# Patient Record
Sex: MALE | Race: WHITE | Employment: OTHER | URBAN - METROPOLITAN AREA
[De-identification: names, ages, dates, MRNs, and addresses within clinical notes are randomized per-mention and may not be internally consistent; named-entity substitution may affect disease eponyms.]

---

## 2018-07-18 ENCOUNTER — HOSPITAL ENCOUNTER (OUTPATIENT)
Facility: CLINIC | Age: 73
Setting detail: OBSERVATION
Discharge: HOME | End: 2018-07-19
Attending: EMERGENCY MEDICINE | Admitting: INTERNAL MEDICINE
Payer: COMMERCIAL

## 2018-07-18 DIAGNOSIS — K62.5 RECTAL HEMORRHAGE: ICD-10-CM

## 2018-07-18 PROBLEM — K92.2 GI BLEED: Status: ACTIVE | Noted: 2018-07-18

## 2018-07-18 LAB
ABO + RH BLD: NORMAL
ABO + RH BLD: NORMAL
ALBUMIN SERPL-MCNC: 3.6 G/DL (ref 3.4–5)
ALP SERPL-CCNC: 125 U/L (ref 40–150)
ALT SERPL W P-5'-P-CCNC: 33 U/L (ref 0–70)
ANION GAP SERPL CALCULATED.3IONS-SCNC: 9 MMOL/L (ref 3–14)
APTT PPP: 26 SEC (ref 22–37)
AST SERPL W P-5'-P-CCNC: 32 U/L (ref 0–45)
BASOPHILS # BLD AUTO: 0 10E9/L (ref 0–0.2)
BASOPHILS NFR BLD AUTO: 0.3 %
BILIRUB SERPL-MCNC: 0.3 MG/DL (ref 0.2–1.3)
BLD GP AB SCN SERPL QL: NORMAL
BLOOD BANK CMNT PATIENT-IMP: NORMAL
BUN SERPL-MCNC: 54 MG/DL (ref 7–30)
CALCIUM SERPL-MCNC: 9.1 MG/DL (ref 8.5–10.1)
CHLORIDE SERPL-SCNC: 111 MMOL/L (ref 94–109)
CO2 SERPL-SCNC: 20 MMOL/L (ref 20–32)
CREAT SERPL-MCNC: 1.16 MG/DL (ref 0.66–1.25)
DIFFERENTIAL METHOD BLD: ABNORMAL
EOSINOPHIL # BLD AUTO: 0.2 10E9/L (ref 0–0.7)
EOSINOPHIL NFR BLD AUTO: 1.3 %
ERYTHROCYTE [DISTWIDTH] IN BLOOD BY AUTOMATED COUNT: 14.3 % (ref 10–15)
GFR SERPL CREATININE-BSD FRML MDRD: 61 ML/MIN/1.7M2
GLUCOSE SERPL-MCNC: 146 MG/DL (ref 70–99)
HCT VFR BLD AUTO: 40.1 % (ref 40–53)
HEMOCCULT STL QL: POSITIVE
HGB BLD-MCNC: 13.1 G/DL (ref 13.3–17.7)
HGB BLD-MCNC: 13.4 G/DL (ref 13.3–17.7)
IMM GRANULOCYTES # BLD: 0.2 10E9/L (ref 0–0.4)
IMM GRANULOCYTES NFR BLD: 1.6 %
INR PPP: 1 (ref 0.86–1.14)
IRON SATN MFR SERPL: 22 % (ref 15–46)
IRON SERPL-MCNC: 74 UG/DL (ref 35–180)
LYMPHOCYTES # BLD AUTO: 1.7 10E9/L (ref 0.8–5.3)
LYMPHOCYTES NFR BLD AUTO: 13.3 %
MAGNESIUM SERPL-MCNC: 1.6 MG/DL (ref 1.6–2.3)
MCH RBC QN AUTO: 30.3 PG (ref 26.5–33)
MCHC RBC AUTO-ENTMCNC: 33.4 G/DL (ref 31.5–36.5)
MCV RBC AUTO: 91 FL (ref 78–100)
MONOCYTES # BLD AUTO: 0.9 10E9/L (ref 0–1.3)
MONOCYTES NFR BLD AUTO: 7.2 %
NEUTROPHILS # BLD AUTO: 9.7 10E9/L (ref 1.6–8.3)
NEUTROPHILS NFR BLD AUTO: 76.3 %
NRBC # BLD AUTO: 0 10*3/UL
NRBC BLD AUTO-RTO: 0 /100
PLATELET # BLD AUTO: 216 10E9/L (ref 150–450)
POTASSIUM SERPL-SCNC: 5.1 MMOL/L (ref 3.4–5.3)
PROT SERPL-MCNC: 7.3 G/DL (ref 6.8–8.8)
RBC # BLD AUTO: 4.42 10E12/L (ref 4.4–5.9)
SODIUM SERPL-SCNC: 140 MMOL/L (ref 133–144)
SPECIMEN EXP DATE BLD: NORMAL
TIBC SERPL-MCNC: 332 UG/DL (ref 240–430)
TSH SERPL DL<=0.005 MIU/L-ACNC: 1.82 MU/L (ref 0.4–4)
WBC # BLD AUTO: 12.7 10E9/L (ref 4–11)

## 2018-07-18 PROCEDURE — 80053 COMPREHEN METABOLIC PANEL: CPT | Performed by: EMERGENCY MEDICINE

## 2018-07-18 PROCEDURE — 25000132 ZZH RX MED GY IP 250 OP 250 PS 637: Mod: GY | Performed by: INTERNAL MEDICINE

## 2018-07-18 PROCEDURE — 25000128 H RX IP 250 OP 636: Performed by: HOSPITALIST

## 2018-07-18 PROCEDURE — 85025 COMPLETE CBC W/AUTO DIFF WBC: CPT | Performed by: EMERGENCY MEDICINE

## 2018-07-18 PROCEDURE — 83735 ASSAY OF MAGNESIUM: CPT | Performed by: HOSPITALIST

## 2018-07-18 PROCEDURE — 99223 1ST HOSP IP/OBS HIGH 75: CPT | Mod: AI | Performed by: HOSPITALIST

## 2018-07-18 PROCEDURE — 83550 IRON BINDING TEST: CPT | Performed by: EMERGENCY MEDICINE

## 2018-07-18 PROCEDURE — 12000000 ZZH R&B MED SURG/OB

## 2018-07-18 PROCEDURE — 36415 COLL VENOUS BLD VENIPUNCTURE: CPT | Performed by: HOSPITALIST

## 2018-07-18 PROCEDURE — 85018 HEMOGLOBIN: CPT | Performed by: HOSPITALIST

## 2018-07-18 PROCEDURE — 83540 ASSAY OF IRON: CPT | Performed by: EMERGENCY MEDICINE

## 2018-07-18 PROCEDURE — A9270 NON-COVERED ITEM OR SERVICE: HCPCS | Mod: GY | Performed by: INTERNAL MEDICINE

## 2018-07-18 PROCEDURE — 86901 BLOOD TYPING SEROLOGIC RH(D): CPT | Performed by: EMERGENCY MEDICINE

## 2018-07-18 PROCEDURE — 85730 THROMBOPLASTIN TIME PARTIAL: CPT | Performed by: EMERGENCY MEDICINE

## 2018-07-18 PROCEDURE — 82272 OCCULT BLD FECES 1-3 TESTS: CPT | Performed by: EMERGENCY MEDICINE

## 2018-07-18 PROCEDURE — 25000132 ZZH RX MED GY IP 250 OP 250 PS 637: Mod: GY | Performed by: HOSPITALIST

## 2018-07-18 PROCEDURE — 86900 BLOOD TYPING SEROLOGIC ABO: CPT | Performed by: EMERGENCY MEDICINE

## 2018-07-18 PROCEDURE — 85610 PROTHROMBIN TIME: CPT | Performed by: EMERGENCY MEDICINE

## 2018-07-18 PROCEDURE — 84443 ASSAY THYROID STIM HORMONE: CPT | Performed by: EMERGENCY MEDICINE

## 2018-07-18 PROCEDURE — 83036 HEMOGLOBIN GLYCOSYLATED A1C: CPT | Performed by: HOSPITALIST

## 2018-07-18 PROCEDURE — 99285 EMERGENCY DEPT VISIT HI MDM: CPT

## 2018-07-18 PROCEDURE — 86850 RBC ANTIBODY SCREEN: CPT | Performed by: EMERGENCY MEDICINE

## 2018-07-18 PROCEDURE — A9270 NON-COVERED ITEM OR SERVICE: HCPCS | Mod: GY | Performed by: HOSPITALIST

## 2018-07-18 RX ORDER — ONDANSETRON 2 MG/ML
4 INJECTION INTRAMUSCULAR; INTRAVENOUS EVERY 6 HOURS PRN
Status: DISCONTINUED | OUTPATIENT
Start: 2018-07-18 | End: 2018-07-19 | Stop reason: HOSPADM

## 2018-07-18 RX ORDER — ONDANSETRON 4 MG/1
4 TABLET, ORALLY DISINTEGRATING ORAL EVERY 6 HOURS PRN
Status: DISCONTINUED | OUTPATIENT
Start: 2018-07-18 | End: 2018-07-19 | Stop reason: HOSPADM

## 2018-07-18 RX ORDER — NALOXONE HYDROCHLORIDE 0.4 MG/ML
.1-.4 INJECTION, SOLUTION INTRAMUSCULAR; INTRAVENOUS; SUBCUTANEOUS
Status: DISCONTINUED | OUTPATIENT
Start: 2018-07-18 | End: 2018-07-19 | Stop reason: HOSPADM

## 2018-07-18 RX ORDER — POTASSIUM CHLORIDE 29.8 MG/ML
20 INJECTION INTRAVENOUS
Status: DISCONTINUED | OUTPATIENT
Start: 2018-07-18 | End: 2018-07-19 | Stop reason: HOSPADM

## 2018-07-18 RX ORDER — MAGNESIUM SULFATE HEPTAHYDRATE 40 MG/ML
4 INJECTION, SOLUTION INTRAVENOUS EVERY 4 HOURS PRN
Status: DISCONTINUED | OUTPATIENT
Start: 2018-07-18 | End: 2018-07-19 | Stop reason: HOSPADM

## 2018-07-18 RX ORDER — MULTIVITAMIN,THERAPEUTIC
1 TABLET ORAL DAILY
COMMUNITY

## 2018-07-18 RX ORDER — OXYCODONE HYDROCHLORIDE 5 MG/1
5-10 TABLET ORAL
Status: DISCONTINUED | OUTPATIENT
Start: 2018-07-18 | End: 2018-07-19 | Stop reason: HOSPADM

## 2018-07-18 RX ORDER — ISOSORBIDE MONONITRATE 30 MG/1
30 TABLET, EXTENDED RELEASE ORAL DAILY
Status: DISCONTINUED | OUTPATIENT
Start: 2018-07-19 | End: 2018-07-19 | Stop reason: HOSPADM

## 2018-07-18 RX ORDER — DILTIAZEM HYDROCHLORIDE 240 MG/1
240 CAPSULE, EXTENDED RELEASE ORAL EVERY MORNING
COMMUNITY

## 2018-07-18 RX ORDER — ACETAMINOPHEN 650 MG/1
650 SUPPOSITORY RECTAL EVERY 4 HOURS PRN
Status: DISCONTINUED | OUTPATIENT
Start: 2018-07-18 | End: 2018-07-19 | Stop reason: HOSPADM

## 2018-07-18 RX ORDER — ACETAMINOPHEN 325 MG/1
650 TABLET ORAL EVERY 4 HOURS PRN
Status: DISCONTINUED | OUTPATIENT
Start: 2018-07-18 | End: 2018-07-19 | Stop reason: HOSPADM

## 2018-07-18 RX ORDER — TAMSULOSIN HYDROCHLORIDE 0.4 MG/1
0.4 CAPSULE ORAL AT BEDTIME
COMMUNITY

## 2018-07-18 RX ORDER — PANTOPRAZOLE SODIUM 40 MG/1
40 TABLET, DELAYED RELEASE ORAL
Status: DISCONTINUED | OUTPATIENT
Start: 2018-07-18 | End: 2018-07-19 | Stop reason: HOSPADM

## 2018-07-18 RX ORDER — SODIUM CHLORIDE 9 MG/ML
INJECTION, SOLUTION INTRAVENOUS CONTINUOUS
Status: DISCONTINUED | OUTPATIENT
Start: 2018-07-18 | End: 2018-07-19

## 2018-07-18 RX ORDER — POTASSIUM CHLORIDE 1.5 G/1.58G
20-40 POWDER, FOR SOLUTION ORAL
Status: DISCONTINUED | OUTPATIENT
Start: 2018-07-18 | End: 2018-07-19 | Stop reason: HOSPADM

## 2018-07-18 RX ORDER — MORPHINE SULFATE 2 MG/ML
1 INJECTION, SOLUTION INTRAMUSCULAR; INTRAVENOUS
Status: DISCONTINUED | OUTPATIENT
Start: 2018-07-18 | End: 2018-07-19 | Stop reason: HOSPADM

## 2018-07-18 RX ORDER — POTASSIUM CHLORIDE 1500 MG/1
20-40 TABLET, EXTENDED RELEASE ORAL
Status: DISCONTINUED | OUTPATIENT
Start: 2018-07-18 | End: 2018-07-19 | Stop reason: HOSPADM

## 2018-07-18 RX ORDER — ATORVASTATIN CALCIUM 40 MG/1
40 TABLET, FILM COATED ORAL AT BEDTIME
Status: DISCONTINUED | OUTPATIENT
Start: 2018-07-18 | End: 2018-07-19 | Stop reason: HOSPADM

## 2018-07-18 RX ORDER — POTASSIUM CL/LIDO/0.9 % NACL 10MEQ/0.1L
10 INTRAVENOUS SOLUTION, PIGGYBACK (ML) INTRAVENOUS
Status: DISCONTINUED | OUTPATIENT
Start: 2018-07-18 | End: 2018-07-19 | Stop reason: HOSPADM

## 2018-07-18 RX ORDER — POTASSIUM CHLORIDE 7.45 MG/ML
10 INJECTION INTRAVENOUS
Status: DISCONTINUED | OUTPATIENT
Start: 2018-07-18 | End: 2018-07-19 | Stop reason: HOSPADM

## 2018-07-18 RX ADMIN — ATORVASTATIN CALCIUM 40 MG: 40 TABLET, FILM COATED ORAL at 22:50

## 2018-07-18 RX ADMIN — PANTOPRAZOLE SODIUM 40 MG: 40 TABLET, DELAYED RELEASE ORAL at 22:50

## 2018-07-18 RX ADMIN — SODIUM CHLORIDE: 9 INJECTION, SOLUTION INTRAVENOUS at 22:58

## 2018-07-18 RX ADMIN — POLYETHYLENE GLYCOL 3350, SODIUM SULFATE ANHYDROUS, SODIUM BICARBONATE, SODIUM CHLORIDE, POTASSIUM CHLORIDE 4000 ML: 236; 22.74; 6.74; 5.86; 2.97 POWDER, FOR SOLUTION ORAL at 22:50

## 2018-07-18 ASSESSMENT — ACTIVITIES OF DAILY LIVING (ADL)
RETIRED_EATING: 0-->INDEPENDENT
SWALLOWING: 0-->SWALLOWS FOODS/LIQUIDS WITHOUT DIFFICULTY
RETIRED_COMMUNICATION: 0-->UNDERSTANDS/COMMUNICATES WITHOUT DIFFICULTY
FALL_HISTORY_WITHIN_LAST_SIX_MONTHS: NO
BATHING: 0-->INDEPENDENT
COGNITION: 0 - NO COGNITION ISSUES REPORTED
DRESS: 0-->INDEPENDENT
AMBULATION: 0-->INDEPENDENT
TOILETING: 0-->INDEPENDENT
TRANSFERRING: 0-->INDEPENDENT

## 2018-07-18 ASSESSMENT — ENCOUNTER SYMPTOMS
BLOOD IN STOOL: 1
ABDOMINAL PAIN: 1
LIGHT-HEADEDNESS: 0
SHORTNESS OF BREATH: 0
DIZZINESS: 0
CONSTIPATION: 0
WEAKNESS: 1
ANAL BLEEDING: 1

## 2018-07-18 NOTE — IP AVS SNAPSHOT
[facility] [facility]    [address] [place] [address]    Phone:  [phone]                                       After Visit Summary   7/18/2018    Alexys Coombs    MRN: [id number]           After Visit Summary Signature Page     I have received my discharge instructions, and my questions have been answered. I have discussed any challenges I see with this plan with the nurse or doctor.    ..........................................................................................................................................  Patient/Patient Representative Signature      ..........................................................................................................................................  Patient Representative Print Name and Relationship to Patient    ..................................................               ................................................  Date                                            Time    ..........................................................................................................................................  Reviewed by Signature/Title    ...................................................              ..............................................  Date                                                            Time

## 2018-07-18 NOTE — IP AVS SNAPSHOT
MRN:[id number]                      After Visit Summary   7/18/2018    Alexys Coombs    MRN: [id number]           Thank you!     Thank you for choosing [facility] for your care. Our goal is always to provide you with excellent care. Hearing back from our patients is one way we can continue to improve our services. Please take a few minutes to complete the written survey that you may receive in the mail after you visit with us. Thank you!        Patient Information     Date Of Birth          1945        Designated Caregiver       Most Recent Value    Caregiver    Will someone help with your care after discharge? yes    Name of designated caregiver zach (wife)    Phone number of caregiver [phone]    Caregiver address [address] [place] [address] [address] [place] [id number]      About your hospital stay     You were admitted on:  July 18, 2018 You last received care in the:  [facility] [address] [facility]    You were discharged on:  July 19, 2018        Reason for your hospital stay       You were admitted to the hospital secondary to rectal Bleed and was found to have Hemorid.                  Who to Call     For medical emergencies, please call 911.  For non-urgent questions about your medical care, please call your primary care provider or clinic, None  For questions related to your surgery, please call your surgery clinic        Attending Provider     Provider Specialty    Tip Morgan MD Emergency Medicine    Jennifer Hanson MD Internal Medicine    Susie Veloz MD Internal Medicine       Primary Care Provider Fax #    Physician No Ref-Primary [phone]      After Care Instructions     Activity       Your activity upon discharge: activity as tolerated and no driving for today            Diet       Follow this diet upon discharge: Orders Placed This Encounter      Regular Diet Adult              Discharge Instructions       You can continue your home medications as described before  "after the discharge.                  Follow-up Appointments     Follow-up and recommended labs and tests       Follow up with primary care provider, Physician No Ref-Primary, within 7 days to evaluate treatment change and for hospital follow- up.  No follow up labs or test are needed.                  Further instructions from your care team       GLASSES, NECKLACE, AND RING WITH PT - returned to patient    Pending Results     No orders found for last 3 day(s).            Statement of Approval     Ordered          18 1225  I have reviewed and agree with all the recommendations and orders detailed in this document.  EFFECTIVE NOW     Approved and electronically signed by:  Susie Veloz MD             Admission Information     Date & Time Provider Department Dept. Phone    2018 Susie Veloz MD [facility] [address] [facility] [phone]      Your Vitals Were     Blood Pressure Pulse Temperature Respirations Height Weight    120/52 (BP Location: Left arm) 83 97.7  F (36.5  C) (Oral) 14 1.778 m (5' 10\") 108.1 kg (238 lb 5.1 oz)    Pulse Oximetry BMI (Body Mass Index)                94% 34.2 kg/m2          [employer] Information     [employer] lets you send messages to your doctor, view your test results, renew your prescriptions, schedule appointments and more. To sign up, go to [email or web address] . Click on \"Log in\" on the left side of the screen, which will take you to the Welcome page. Then click on \"Sign up Now\" on the right side of the page.     You will be asked to enter the access code listed below, as well as some personal information. Please follow the directions to create your username and password.     Your access code is: [email or web address]  Expires: 10/17/2018  1:33 PM     Your access code will  in 90 days. If you need help or a new code, please call your [facility] clinic or [phone].        Care EveryWhere ID     This is your Care EveryWhere ID. This could be used by other " organizations to access your [facility] medical records  [id number]        Equal Access to Services     MERRYREGAN MYRON : Esperanza Easley, sarwat villasenor, janis nair, carlos sullivan. So [facility] [phone].    ATENCIÓN: Si habla español, tiene a ford disposición servicios gratuitos de asistencia lingüística. Llame al [phone].    We comply with applicable federal civil rights laws and [place] laws. We do not discriminate on the basis of race, color, national origin, age, disability, sex, sexual orientation, or gender identity.               Review of your medicines      CONTINUE these medicines which have NOT CHANGED        Dose / Directions    ASPIRIN EC PO        Dose:  81 mg   Take 81 mg by mouth daily   Refills:  0       ATORVASTATIN CALCIUM PO        Dose:  40 mg   Take 40 mg by mouth every evening   Refills:  0       Co-Enzyme Q-10 100 MG Caps        Dose:  100 mg   Take 100 mg by mouth At Bedtime   Refills:  0       diltiazem 240 MG 24 hr ER beaded capsule   Commonly known as:  TIAZAC        Dose:  240 mg   Take 240 mg by mouth every morning   Refills:  0       FINASTERIDE PO        Dose:  5 mg   Take 5 mg by mouth At Bedtime   Refills:  0       FLOMAX 0.4 MG capsule   Generic drug:  tamsulosin        Dose:  0.4 mg   Take 0.4 mg by mouth At Bedtime   Refills:  0       HUMULIN R U-500 (CONCENTRATED) SC        Dose:  60 Units   Inject 60 Units Subcutaneous 3 times daily (before meals) Inject 60 Units of U-500 30 minutes before breakfast, 60 Units of U-500 30 minutes before afternoon meal and 60 Units of U-500 30 minutes before dinner.  (This is a HIGH RISK MEDICATION. U-500 INSULIN SYRINGES SHOULD BE USED TO MEASURE AND DELIVER INSULIN DOSE).  +++He states he uses the green syringes that go with the U-500 Insulin and draws up 60 units.+++   Refills:  0       IMDUR PO        Dose:  30 mg   Take 30 mg by mouth every morning   Refills:  0       METFORMIN HCL  PO        Dose:  500 mg   Take 500 mg by mouth 2 times daily (with meals)   Refills:  0       METOPROLOL TARTRATE PO        Dose:  12.5 mg   Take 12.5 mg by mouth 2 times daily   Refills:  0       multivitamin, therapeutic Tabs tablet        Dose:  1 tablet   Take 1 tablet by mouth daily   Refills:  0       PLAVIX PO        Dose:  75 mg   Take 75 mg by mouth every morning   Refills:  0       SODIUM BICARBONATE PO        Dose:  1300 mg   Take 1,300 mg by mouth 2 times daily 10 grain tablets --he takes 2 tablets twice daily.   Refills:  0       SPIRONOLACTONE PO        Dose:  25 mg   Take 25 mg by mouth At Bedtime   Refills:  0       TYLENOL PO        Dose:  500 mg   Take 500 mg by mouth 2 times daily   Refills:  0       VITAMIN D (CHOLECALCIFEROL) PO        Dose:  2000 Units   Take 2,000 Units by mouth 2 times daily   Refills:  0                Protect others around you: Learn how to safely use, store and throw away your medicines at www.disposemymeds.org.             Medication List: This is a list of all your medications and when to take them. Check marks below indicate your daily home schedule. Keep this list as a reference.      Medications           Morning Afternoon Evening Bedtime As Needed    ASPIRIN EC PO   Take 81 mg by mouth daily                                ATORVASTATIN CALCIUM PO   Take 40 mg by mouth every evening   Last time this was given:  40 mg on 7/18/2018 10:50 PM                                Co-Enzyme Q-10 100 MG Caps   Take 100 mg by mouth At Bedtime                                diltiazem 240 MG 24 hr ER beaded capsule   Commonly known as:  TIAZAC   Take 240 mg by mouth every morning                                FINASTERIDE PO   Take 5 mg by mouth At Bedtime                                FLOMAX 0.4 MG capsule   Take 0.4 mg by mouth At Bedtime   Generic drug:  tamsulosin                                HUMULIN R U-500 (CONCENTRATED) SC   Inject 60 Units Subcutaneous 3 times daily (before  meals) Inject 60 Units of U-500 30 minutes before breakfast, 60 Units of U-500 30 minutes before afternoon meal and 60 Units of U-500 30 minutes before dinner.  (This is a HIGH RISK MEDICATION. U-500 INSULIN SYRINGES SHOULD BE USED TO MEASURE AND DELIVER INSULIN DOSE).  +++He states he uses the green syringes that go with the U-500 Insulin and draws up 60 units.+++                                IMDUR PO   Take 30 mg by mouth every morning                                METFORMIN HCL PO   Take 500 mg by mouth 2 times daily (with meals)                                METOPROLOL TARTRATE PO   Take 12.5 mg by mouth 2 times daily                                multivitamin, therapeutic Tabs tablet   Take 1 tablet by mouth daily                                PLAVIX PO   Take 75 mg by mouth every morning                                SODIUM BICARBONATE PO   Take 1,300 mg by mouth 2 times daily 10 grain tablets --he takes 2 tablets twice daily.                                SPIRONOLACTONE PO   Take 25 mg by mouth At Bedtime                                TYLENOL PO   Take 500 mg by mouth 2 times daily                                VITAMIN D (CHOLECALCIFEROL) PO   Take 2,000 Units by mouth 2 times daily                                          More Information        Lower GI Bleeding (Stable)  You have signs of blood in your stool. This is called rectal bleeding. The bleeding may have begun in another part of your gastrointestinal (GI) tract. If the blood is bright red, it is likely coming from the lower part of the GI tract. If the blood is black or dark, it might be coming from higher up in the GI tract. Very small amounts of GI bleeding may not be visible and can only be discovered during a test on your stool. Possible causes of lower GI bleeding include:    Hemorrhoids    Anal fissures    Diverticulitis    Inflammatory bowel disease (Crohn's disease or ulcerative colitis)    Polyps (growths) in the  intestine  Note: Iron supplements and medicines for diarrhea or upset stomach can cause black stools. Foods such as licorice and red beets can also discolor the stool and be mistaken for bleeding. These are not bleeding and are not a cause for alarm.  Home care  You have not lost a large amount of blood and your condition appears stable at this time. You may resume normal activity as long as you feel well.  Don't take NSAIDs, such as aspirin, ibuprofen, or naproxen. They can irritate the stomach and cause further bleeding. If you are taking these medicines for other medical reasons, talk to your healthcare provider before you stop them.   Follow-up care  Follow up with your healthcare provider as advised. Further tests may be needed to find the cause of your bleeding.  When to seek medical advice  Call your healthcare provider right away for any of the following:    Large amount of rectal bleeding     Increasing abdominal pain    Weakness, dizziness  Call 911  Call 911 if any of the following occur:    Loss of consciousness    Vomiting blood  Date Last Reviewed: 6/24/2015 2000-2017 [employer]. [address] [place] [address]. All rights reserved. This information is not intended as a substitute for professional medical care. Always follow your healthcare professional's instructions.

## 2018-07-19 VITALS
SYSTOLIC BLOOD PRESSURE: 120 MMHG | RESPIRATION RATE: 14 BRPM | OXYGEN SATURATION: 94 % | HEART RATE: 83 BPM | WEIGHT: 238.32 LBS | BODY MASS INDEX: 34.12 KG/M2 | DIASTOLIC BLOOD PRESSURE: 52 MMHG | TEMPERATURE: 97.7 F | HEIGHT: 70 IN

## 2018-07-19 PROBLEM — K62.5 RECTAL BLEED: Status: ACTIVE | Noted: 2018-07-19

## 2018-07-19 LAB
ALBUMIN SERPL-MCNC: 3.2 G/DL (ref 3.4–5)
ALP SERPL-CCNC: 87 U/L (ref 40–150)
ALT SERPL W P-5'-P-CCNC: 29 U/L (ref 0–70)
ANION GAP SERPL CALCULATED.3IONS-SCNC: 11 MMOL/L (ref 3–14)
AST SERPL W P-5'-P-CCNC: 29 U/L (ref 0–45)
BILIRUB DIRECT SERPL-MCNC: 0.1 MG/DL (ref 0–0.2)
BILIRUB SERPL-MCNC: 0.6 MG/DL (ref 0.2–1.3)
BUN SERPL-MCNC: 43 MG/DL (ref 7–30)
CALCIUM SERPL-MCNC: 8.6 MG/DL (ref 8.5–10.1)
CHLORIDE SERPL-SCNC: 109 MMOL/L (ref 94–109)
CO2 SERPL-SCNC: 22 MMOL/L (ref 20–32)
COLONOSCOPY: NORMAL
CREAT SERPL-MCNC: 1.06 MG/DL (ref 0.66–1.25)
ERYTHROCYTE [DISTWIDTH] IN BLOOD BY AUTOMATED COUNT: 14.4 % (ref 10–15)
GFR SERPL CREATININE-BSD FRML MDRD: 68 ML/MIN/1.7M2
GLUCOSE BLDC GLUCOMTR-MCNC: 136 MG/DL (ref 70–99)
GLUCOSE BLDC GLUCOMTR-MCNC: 151 MG/DL (ref 70–99)
GLUCOSE BLDC GLUCOMTR-MCNC: 151 MG/DL (ref 70–99)
GLUCOSE SERPL-MCNC: 122 MG/DL (ref 70–99)
HBA1C MFR BLD: 7.2 % (ref 0–5.6)
HCT VFR BLD AUTO: 36 % (ref 40–53)
HGB BLD-MCNC: 11.8 G/DL (ref 13.3–17.7)
INR PPP: 1 (ref 0.86–1.14)
LIPASE SERPL-CCNC: 84 U/L (ref 73–393)
MCH RBC QN AUTO: 29.6 PG (ref 26.5–33)
MCHC RBC AUTO-ENTMCNC: 32.8 G/DL (ref 31.5–36.5)
MCV RBC AUTO: 90 FL (ref 78–100)
PLATELET # BLD AUTO: 205 10E9/L (ref 150–450)
POTASSIUM SERPL-SCNC: 4.2 MMOL/L (ref 3.4–5.3)
PROT SERPL-MCNC: 6.5 G/DL (ref 6.8–8.8)
RBC # BLD AUTO: 3.99 10E12/L (ref 4.4–5.9)
SODIUM SERPL-SCNC: 142 MMOL/L (ref 133–144)
UPPER GI ENDOSCOPY: NORMAL
WBC # BLD AUTO: 13.5 10E9/L (ref 4–11)

## 2018-07-19 PROCEDURE — 85610 PROTHROMBIN TIME: CPT | Performed by: HOSPITALIST

## 2018-07-19 PROCEDURE — 25000125 ZZHC RX 250: Performed by: INTERNAL MEDICINE

## 2018-07-19 PROCEDURE — 99217 ZZC OBSERVATION CARE DISCHARGE: CPT | Performed by: INTERNAL MEDICINE

## 2018-07-19 PROCEDURE — 36415 COLL VENOUS BLD VENIPUNCTURE: CPT | Performed by: HOSPITALIST

## 2018-07-19 PROCEDURE — 45378 DIAGNOSTIC COLONOSCOPY: CPT | Performed by: INTERNAL MEDICINE

## 2018-07-19 PROCEDURE — 80048 BASIC METABOLIC PNL TOTAL CA: CPT | Performed by: HOSPITALIST

## 2018-07-19 PROCEDURE — 25000131 ZZH RX MED GY IP 250 OP 636 PS 637: Performed by: HOSPITALIST

## 2018-07-19 PROCEDURE — 85027 COMPLETE CBC AUTOMATED: CPT | Performed by: HOSPITALIST

## 2018-07-19 PROCEDURE — A9270 NON-COVERED ITEM OR SERVICE: HCPCS | Mod: GY | Performed by: HOSPITALIST

## 2018-07-19 PROCEDURE — 80076 HEPATIC FUNCTION PANEL: CPT | Performed by: HOSPITALIST

## 2018-07-19 PROCEDURE — G0500 MOD SEDAT ENDO SERVICE >5YRS: HCPCS | Performed by: INTERNAL MEDICINE

## 2018-07-19 PROCEDURE — 25000128 H RX IP 250 OP 636: Performed by: INTERNAL MEDICINE

## 2018-07-19 PROCEDURE — 25000132 ZZH RX MED GY IP 250 OP 250 PS 637: Performed by: HOSPITALIST

## 2018-07-19 PROCEDURE — G0378 HOSPITAL OBSERVATION PER HR: HCPCS

## 2018-07-19 PROCEDURE — 43235 EGD DIAGNOSTIC BRUSH WASH: CPT | Performed by: INTERNAL MEDICINE

## 2018-07-19 PROCEDURE — 00000146 ZZHCL STATISTIC GLUCOSE BY METER IP

## 2018-07-19 PROCEDURE — 83690 ASSAY OF LIPASE: CPT | Performed by: HOSPITALIST

## 2018-07-19 RX ORDER — NICOTINE POLACRILEX 4 MG
15-30 LOZENGE BUCCAL
Status: DISCONTINUED | OUTPATIENT
Start: 2018-07-19 | End: 2018-07-19 | Stop reason: HOSPADM

## 2018-07-19 RX ORDER — FLUMAZENIL 0.1 MG/ML
0.2 INJECTION, SOLUTION INTRAVENOUS
Status: DISCONTINUED | OUTPATIENT
Start: 2018-07-19 | End: 2018-07-19 | Stop reason: HOSPADM

## 2018-07-19 RX ORDER — FENTANYL CITRATE 50 UG/ML
INJECTION, SOLUTION INTRAMUSCULAR; INTRAVENOUS PRN
Status: DISCONTINUED | OUTPATIENT
Start: 2018-07-19 | End: 2018-07-19 | Stop reason: HOSPADM

## 2018-07-19 RX ORDER — LIDOCAINE 40 MG/G
CREAM TOPICAL
Status: DISCONTINUED | OUTPATIENT
Start: 2018-07-19 | End: 2018-07-19 | Stop reason: HOSPADM

## 2018-07-19 RX ORDER — DEXTROSE MONOHYDRATE 25 G/50ML
25-50 INJECTION, SOLUTION INTRAVENOUS
Status: DISCONTINUED | OUTPATIENT
Start: 2018-07-19 | End: 2018-07-19 | Stop reason: HOSPADM

## 2018-07-19 RX ORDER — NALOXONE HYDROCHLORIDE 0.4 MG/ML
.1-.4 INJECTION, SOLUTION INTRAMUSCULAR; INTRAVENOUS; SUBCUTANEOUS
Status: DISCONTINUED | OUTPATIENT
Start: 2018-07-19 | End: 2018-07-19 | Stop reason: HOSPADM

## 2018-07-19 RX ADMIN — PANTOPRAZOLE SODIUM 40 MG: 40 TABLET, DELAYED RELEASE ORAL at 06:33

## 2018-07-19 RX ADMIN — INSULIN ASPART 1 UNITS: 100 INJECTION, SOLUTION INTRAVENOUS; SUBCUTANEOUS at 06:33

## 2018-07-19 ASSESSMENT — ACTIVITIES OF DAILY LIVING (ADL)
ADLS_ACUITY_SCORE: 9

## 2018-07-19 NOTE — PLAN OF CARE
Problem: Patient Care Overview  Goal: Plan of Care/Patient Progress Review  Outcome: No Change  A/O x4. VSS on RA. Up with SBA. Finished bowel prep at 0300. Going for colonoscopy and possibly EGD today. Time TBD. BS + , No N/V or c/o pain. Bleeding with BMs continues. Last Hb 13.1. NPO. IVF. On home CPAP overnight. RN will continue to monitor.

## 2018-07-19 NOTE — UTILIZATION REVIEW
"[facility]    Admission Status; Secondary Review Determination     Admission Date: 7/18/2018  7:47 PM      Under the authority of the Utilization Management Committee, the utilization review process indicated a secondary review on the above patient.  The review outcome is based on review of the medical records, discussions with staff, and applying clinical experience noted on the date of the review.          (x) Observation Status Appropriate - This patient does not meet hospital inpatient criteria and is placed in observation status. If this patient's primary payer is Medicare and was admitted as an inpatient, Condition Code 44 should be used and patient status changed to \"observation\".     RATIONALE FOR DETERMINATION   72 year old  male with medical history of coronary artery disease, peripheral arterial disease, hypertension, diabetes, hyperlipidemia, history of diverticulosis, hemorrhoids in the past presented to the ED with rectal bleeding.  Gastroenterology has been consulted and an EGD was performed showing an esophageal mild stricture and esophagitis.  Colonoscopy was also performed showing diverticulosis but no signs of bleeding.  A regular diet has been initiated and discharge home is expected today or tomorrow. At the time of this review, the patient does not meet medical necessity for inpatient hospitalization.     The severity of illness, intensity of service provided, expected LOS and risk for adverse outcome make the care appropriate for further observation; however, doesn't meet criteria for hospital inpatient admission. Dr Veloz was paged with this recommendation.        The information on this document is developed by the utilization review team in order for the business office to ensure compliance.  This only denotes the appropriateness of proper admission status and does not reflect the quality of care rendered.         The definitions of Inpatient Status and Observation Status " used in making the determination above are those provided in the CMS Coverage Manual, Chapter 1 and Chapter 6, section 70.4.      Sincerely,     Ab Day DO MPH   Physician Advisor  Utilization Review  [facility]

## 2018-07-19 NOTE — DISCHARGE SUMMARY
[facility]    Discharge Summary  Hospitalist    Date of Admission:  7/18/2018  Date of Discharge:  7/19/2018  Discharging Provider: Susie Veloz MD,FACP    Discharge Diagnoses     Active Problems:  Rectal bleed Most likely secondary to hemorrhoid.  History of coronary artery disease.  History of CVA.  Diabetes mellitus.  Essential hypertension.  Peripheral arterial disease.    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 Had 3 to 4 episodes during the day with blood clots that prompted him to come into the ED. No nausea vomiting. Denies any abdominal pain, no headache, no chest pain, no palpitations. Does not have any shortness of breath or cough or fever. Does have some lightheadedness on movements, had poor oral intake today.Patient states it's unusual than his diverticular or hemorrhoidal bleed that he had in the past around five years ago.  He denies any alcohol intake, no history of taking over-the-counter pain medications. No smoking. No family history of malignancy. Last colonoscopy was around 10 years back, states his primary doctor had referred him for a colonoscopy which is due currently.   In the ED on rectal exam had no external lesions, rectal tone normal, maroon red blood in the rectal vault. WBC 12.7, hemoglobin 13.4, platelets 216, INR 1.0.  Glucose 146, creatinine 1.16, chloride 111.  Occult blood positive.  Blood pressures have been around 100 to 120 systolic's received 1 L fluid bolus    Hospital Course   Alexys Coombs was admitted on 7/18/2018.  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 Had 3 to 4 episodes during the day with blood clots that prompted him to come into the ED.Patient stated it's unusual than his diverticular or hemorrhoidal bleed that he had in the past around five years ago. In the ED on rectal exam had no external lesions, rectal tone normal, maroon red blood in the rectal vault. WBC 12.7, hemoglobin 13.4, platelets 216, INR 1.0.  Occult blood was positive.  Blood pressures have been around 100 to 120 systolic's received 1 L fluid bolus. Also received one dose of IV pantoprazole in the ED. patient was admitted for further evaluation and management, he was made n.p.o. and gastroenterology was consulted.  GI started patient on the colonoscopy prep and took patient for EGD and colonoscopy this morning.  His EGD showed grade C reflux esophagitis, benign-appearing esophageal stenosis and a small hiatal hernia.  Colonoscopy showed diverticulosis in the sigmoid colon and the descending colon and in the transverse colon but no signs of active or old bleeding.  He was found to have internal hemorrhoids seen on retroflexion which was not bleeding at this point.  -- He can start taking his Plavix and ASA after the discharge as per GI recommendation .    His other chronic medical conditions are stable:  Coronary artery disease: Patient is establish medical care in [place], follows intermittently with cardiologist.  Peripheral arterial disease.  Hyperlipidemia.  Hypertension.  BPH.   Diabetes   Obesity with a BMI of 30.58  --  Continue to take home medications after the discharge.    # Discharge Pain Plan:   - Patient currently has NO PAIN and is not being prescribed pain medications on discharge.    Patient was seen and examined on the day of discharge , he is feeling well, does not have any complaints , I did review the discharge medications and instructions with the patient and plan for him to follow up with the PCP after the hospitalization .patient was in agreement , he is discharged in stable condition back to his home.He will be following up with his PCP in [place].    Susie Veloz MD, [facility]     Significant Results and Procedures    As Below.    Pending Results   NONE    Unresulted Labs Ordered in the Past 30 Days of this Admission     No orders found for last 61 day(s).        Code Status   Full Code       Primary Care Physician   Physician No Ref-Primary    Physical Exam   Temp: 97.7  F (36.5  C) Temp src: Oral BP: 120/52 Pulse: 83 Heart Rate: 79 Resp: 14 SpO2: 94 % O2 Device: None (Room air) Oxygen Delivery: 3 LPM  Vitals:    07/18/18 1913 07/19/18 0500   Weight: 106.1 kg (234 lb) 108.1 kg (238 lb 5.1 oz)     Vital Signs with Ranges  Temp:  [97.6  F (36.4  C)-98.5  F (36.9  C)] 97.7  F (36.5  C)  Pulse:  [72-83] 83  Heart Rate:  [70-91] 79  Resp:  [11-21] 14  BP: ()/(44-80) 120/52  SpO2:  [90 %-99 %] 94 %       Constitutional: Awake, alert, cooperative, no apparent distress, wife present at the bedside   Respiratory: Clear to auscultation bilaterally, no crackles or wheezing  Cardiovascular: Regular rate and rhythm, normal S1 and S2, and no murmur noted  GI: Normal bowel sounds, soft, non-distended, non-tender  Skin/Integumen: No rashes, no cyanosis, no edema    Discharge Disposition   Discharged to home  Condition at discharge: Stable    Consultations This Hospital Stay   GASTROENTEROLOGY IP CONSULT    Time Spent on this Encounter   Susie WEST, personally saw the patient today and spent less than or  equal to 30 minutes discharging this patient.    Discharge Orders     Reason for your hospital stay   You were admitted to the hospital secondary to rectal Bleed and was found to have Hemorid.     Follow-up and recommended labs and tests   Follow up with primary care provider, Physician No Ref-Primary, within 7 days to evaluate treatment change and for hospital follow- up.  No follow up labs or test are needed.     Activity   Your activity upon discharge: activity as tolerated and no driving for today     Discharge Instructions   You can continue your home medications as described before after the discharge.     Full Code     Diet   Follow this diet upon discharge: Orders Placed This Encounter     Regular Diet Adult         Discharge Medications   Current Discharge Medication List      CONTINUE these medications which have NOT CHANGED    Details   Acetaminophen (TYLENOL PO) Take 500 mg by mouth 2 times daily       ASPIRIN EC PO Take 81 mg by mouth daily      ATORVASTATIN CALCIUM PO Take 40 mg by mouth every evening       Clopidogrel Bisulfate (PLAVIX PO) Take 75 mg by mouth every morning       Co-Enzyme Q-10 100 MG CAPS Take 100 mg by mouth At Bedtime      diltiazem (TIAZAC) 240 MG 24 hr ER beaded capsule Take 240 mg by mouth every morning      FINASTERIDE PO Take 5 mg by mouth At Bedtime      Insulin Regular Human (HUMULIN R U-500, CONCENTRATED, SC) Inject 60 Units Subcutaneous 3 times daily (before meals) Inject 60 Units of U-500 30 minutes before breakfast, 60 Units of U-500 30 minutes before afternoon meal and 60 Units of U-500 30 minutes before dinner.  (This is a HIGH RISK MEDICATION. U-500 INSULIN SYRINGES SHOULD BE USED TO MEASURE AND DELIVER INSULIN DOSE).  +++He states he uses the green syringes that go with the U-500 Insulin and draws up 60 units.+++      Isosorbide Mononitrate (IMDUR PO) Take 30 mg by mouth every morning       METFORMIN HCL PO Take 500 mg by mouth 2 times daily (with meals)        METOPROLOL TARTRATE PO Take 12.5 mg by mouth 2 times daily       multivitamin, therapeutic (THERA-VIT) TABS tablet Take 1 tablet by mouth daily      SODIUM BICARBONATE PO Take 1,300 mg by mouth 2 times daily 10 grain tablets --he takes 2 tablets twice daily.      SPIRONOLACTONE PO Take 25 mg by mouth At Bedtime       tamsulosin (FLOMAX) 0.4 MG capsule Take 0.4 mg by mouth At Bedtime       VITAMIN D, CHOLECALCIFEROL, PO Take 2,000 Units by mouth 2 times daily            Allergies   Allergies   Allergen Reactions     Losartan Hives     Data   Most Recent 3 CBC's:  Recent Labs   Lab Test  07/19/18   0751 07/18/18 2255 07/18/18 2008   WBC  13.5*   --   12.7*   HGB  11.8*  13.1*  13.4   MCV  90   --   91   PLT  205   --   216      Most Recent 3 BMP's:  Recent Labs   Lab Test  07/19/18 0751 07/18/18 2008   NA  142  140   POTASSIUM  4.2  5.1   CHLORIDE  109  111*   CO2  22  20   BUN  43*  54*   CR  1.06  1.16   ANIONGAP  11  9   SHIMA  8.6  9.1   GLC  122*  146*     Most Recent 2 LFT's:  Recent Labs   Lab Test  07/19/18 0751 07/18/18 2008   AST  29  32   ALT  29  33   ALKPHOS  87  125   BILITOTAL  0.6  0.3     Most Recent INR's and Anticoagulation Dosing History:  Anticoagulation Dose History     Recent Dosing and Labs Latest Ref Rng & Units 7/18/2018 7/19/2018    INR 0.86 - 1.14 1.00 1.00        Most Recent 3 Troponin's:No lab results found.  Most Recent Cholesterol Panel:No lab results found.  Most Recent 6 Bacteria Isolates From Any Culture (See EPIC Reports for Culture Details):No lab results found.  Most Recent TSH, T4 and A1c Labs:  Recent Labs   Lab Test  07/18/18 2255 07/18/18 2008   TSH   --   1.82   A1C  7.2*   --      No results found for this or any previous visit.

## 2018-07-19 NOTE — PLAN OF CARE
Problem: Patient Care Overview  Goal: Plan of Care/Patient Progress Review  Outcome: Adequate for Discharge Date Met: 07/19/18  Pt is alert.Colonoscopy and endoscopy done .No GI bleeding ,vomiting ,pain or nausea.Discharge paperwork given.Pt went home with family.Vital signs stable.

## 2018-07-19 NOTE — PROGRESS NOTES
EGD; Esophageal mild stricture and esophagitis.  Colonoscopy: Extensive diverticulosis. No signs of bleeding.    Start regular diet.  Possible discharge home from GI stand point.    Roberth Dukes MD  [phone]

## 2018-07-19 NOTE — H&P
[facility]    History and Physical  Hospitalist    Alexys Coombs MRN# [id number]   Age: 72 year old YOB: 1945     Date of Admission:  7/18/2018    Primary care provider: No Ref-Primary, Physician          Assessment and Plan:     Alexys Coombs is a 72 year old  male with medical history of coronary artery disease, peripheral arterial disease, hypertension, diabetes, hyperlipidemia, history of diverticulosis, hemorrhoids in the past presented to the ED with rectal bleeding.    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 Had 3 to 4 episodes during the day with blood clots that prompted him to come into the ED.Patient states it's unusual than his diverticular or hemorrhoidal bleed that he had in the past around five years ago.  He denies any alcohol intake, no history of taking over-the-counter pain medications. No smoking. No family history of malignancy. Last colonoscopy was around 10 years back, states his primary doctor had referred him for a colonoscopy which is due currently.    In the ED on rectal exam had no external lesions, rectal tone normal, maroon red blood in the rectal vault. WBC 12.7, hemoglobin 13.4, platelets 216, INR 1.0.  Glucose 146, creatinine 1.16, chloride 111.  Occult blood positive.  Blood pressures have been around 100 to 120 systolic's received 1 L fluid bolus. Also received one dose of IV pantoprazole in the ED.    Admitted to inpatient.  Telemetry monitoring   Check hemoglobin midnight, tomorrow morning. Will transfuse if hemoglobin less than seven.  Orthostatic vitals signs. Strict input-output monitoring.  Have discussed with patient, and agree with holding aspirin and Plavix.  Iron studies, TSH- will follow   Gentle hydration with NS at hundred ML per  hour.  NPO from midnight.  Pantoprazole 40 mg IV daily.  ED team has discussed with Dr. Dukes from gastroenterology, recommend colonoscopy prep tonight and possible colonoscopy tomorrow in the morning.    Coronary artery disease.  Patient is establish medical care in [place], follows intermittently with cardiologist.  Hold PTA aspirin/Plavix.  Restart PTA imdur 30 mg oral daily with hold parameters.  Can consider restarting the PTA Cardizem/metoprolol/Spironolactone once medication/dosing confirmed by pharmacy.  Telemetry monitoring.    Peripheral arterial disease.  Chronic and stable.  Hold PTA aspirin/Plavix given G.I. bleed.    Hyperlipidemia.  Continue PTA atorvastatin.    Hypertension.  Restart PTA imdur 30 mg oral daily with hold parameters.  Can consider restarting the PTA Cardizem/metoprolol/Spironolactone once medication/dosing confirmed by pharmacy.    BPH.  Will consider restarting PTA tamsulosin once medication/dosing confirmed by pharmacy.    Diabetes   Hold PTA insulin and metformin   Low resistance insulin sliding scale   Q4 hr blood sugar monitor.  Will check hemoglobin A1c.    Obesity with a BMI of 30.58.  Consider Lifestyle modification as outpatient.    # Pain Assessment:  Current Pain Score 7/18/2018   Pain score (0-10) 0   Alexys duffy pain level was assessed and he currently denies pain.       DVT Prophylaxis: Pneumatic Compression Devices  Code Status: Full Code, discussed    Disposition: Expected discharge in 2 days pending clinical improvement   Patient, his wife by bedside, interdisciplinary team involved in care and agrees with plan.    Total time 40 minutes. More than 50% of time spent in direct patient care, care coordination and formalizing plan of care.    Jennifer Hanson MD          Chief Complaint:     History is obtained from the patient     Alexys Coombs is a 72 year old  male with medical history of coronary artery disease, peripheral arterial disease, hypertension, diabetes,  hyperlipidemia, history of diverticulosis, hemorrhoids in the past presented to the ED with rectal bleeding.    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 Had 3 to 4 episodes during the day with blood clots that prompted him to come into the ED. No nausea vomiting. Denies any abdominal pain, no headache, no chest pain, no palpitations. Does not have any shortness of breath or cough or fever. Does have some lightheadedness on movements, had poor oral intake today.  Patient states it's unusual than his diverticular or hemorrhoidal bleed that he had in the past around five years ago.  He denies any alcohol intake, no history of taking over-the-counter pain medications. No smoking. No family history of malignancy. Last colonoscopy was around 10 years back, states his primary doctor had referred him for a colonoscopy which is due currently.    In the ED on rectal exam had no external lesions, rectal tone normal, maroon red blood in the rectal vault. WBC 12.7, hemoglobin 13.4, platelets 216, INR 1.0.  Glucose 146, creatinine 1.16, chloride 111.  Occult blood positive.  Blood pressures have been around 100 to 120 systolic's received 1 L fluid bolus.         Review of Systems:     GENERAL: No weight changes, no fever or chills  EENT: No new vision changes, no sinus problems, no difficulty swallowing, no hearing difficulty  PULMONARY: No shortness of breath, no cough, no wheezing  CARDIAC: no chest pain, no irregular or fast heart beats   GI: No abdominal pain, nausea, vomiting  : No burning/pain with urination, no urgency, no hematuria.   NEURO: No significant headaches, no slurred speech, no seizures, no loss of consciousness  ENDOCRINE: No excessive thirst, no excessive bruising.  MUSCULOSKELETAL: No joint pain or swelling.  SKIN: No skin rashes  PSYCHIATRY no anxiety or depression         Past  Medical History:     Past Medical History:   Diagnosis Date     CAD (coronary artery disease)      Cerebral infarction (H)      Diabetes (H)      Hypertension      PAD (peripheral artery disease) (H)              Past Surgical History:      Past Surgical History:   Procedure Laterality Date     CARDIAC SURGERY               Social History:      Social History   Substance Use Topics     Smoking status: Not on file     Smokeless tobacco: Not on file     Alcohol use Not on file             Family History:   mother has heart disease,  father had the pacemaker placement.         Allergies:     Allergies   Allergen Reactions     Losartan Hives             Medications:   home medications reviewed.         Physical Exam      Admission Weight: 106.1 kg (234 lb)  Current Weight: 106.1 kg (234 lb)    Vital Signs with Ranges  Temp:  [97.8  F (36.6  C)] 97.8  F (36.6  C)  Heart Rate:  [75-76] 76  Resp:  [15-18] 18  BP: ()/(49-79) 133/79  SpO2:  [92 %-99 %] 94 %  No intake or output data in the 24 hours ending 07/18/18 2207    PHYSICAL EXAM  GENERAL: Patient is in no distress. Alert and oriented.  HEENT: Oropharynx pink, moist. Pupils equal  HEART: Regular rate and rhythm. S1S2. No murmurs  LUNGS: Clear to auscultation bilaterally. No expiratory wheeze.  ABDOMEN: Soft, no abdominal tenderness, bowel sounds heard   NEURO: Cranial nerves II-XII intact. Motor and sensory system in normal range  EXTREMITIES: No pedal edema. 2+ peripheral pulses.  SKIN: Warm, dry. Chronic stasis changes on bilateral lower extremities.  PSYCHIATRY Cooperative       Data:     Results for orders placed or performed during the hospital encounter of 07/18/18 (from the past 24 hour(s))   Occult blood stool   Result Value Ref Range    Occult Blood Positive (A) NEG^Negative   CBC with platelets differential   Result Value Ref Range    WBC 12.7 (H) 4.0 - 11.0 10e9/L    RBC Count 4.42 4.4 - 5.9 10e12/L    Hemoglobin 13.4 13.3 - 17.7 g/dL    Hematocrit  40.1 40.0 - 53.0 %    MCV 91 78 - 100 fl    MCH 30.3 26.5 - 33.0 pg    MCHC 33.4 31.5 - 36.5 g/dL    RDW 14.3 10.0 - 15.0 %    Platelet Count 216 150 - 450 10e9/L    Diff Method Automated Method     % Neutrophils 76.3 %    % Lymphocytes 13.3 %    % Monocytes 7.2 %    % Eosinophils 1.3 %    % Basophils 0.3 %    % Immature Granulocytes 1.6 %    Nucleated RBCs 0 0 /100    Absolute Neutrophil 9.7 (H) 1.6 - 8.3 10e9/L    Absolute Lymphocytes 1.7 0.8 - 5.3 10e9/L    Absolute Monocytes 0.9 0.0 - 1.3 10e9/L    Absolute Eosinophils 0.2 0.0 - 0.7 10e9/L    Absolute Basophils 0.0 0.0 - 0.2 10e9/L    Abs Immature Granulocytes 0.2 0 - 0.4 10e9/L    Absolute Nucleated RBC 0.0    ABO/Rh type and screen   Result Value Ref Range    ABO O     RH(D) Pos     Antibody Screen Neg     Test Valid Only At [facility]        Specimen Expires 07/21/2018    INR   Result Value Ref Range    INR 1.00 0.86 - 1.14   Partial thromboplastin time   Result Value Ref Range    PTT 26 22 - 37 sec   Comprehensive metabolic panel   Result Value Ref Range    Sodium 140 133 - 144 mmol/L    Potassium 5.1 3.4 - 5.3 mmol/L    Chloride 111 (H) 94 - 109 mmol/L    Carbon Dioxide 20 20 - 32 mmol/L    Anion Gap 9 3 - 14 mmol/L    Glucose 146 (H) 70 - 99 mg/dL    Urea Nitrogen 54 (H) 7 - 30 mg/dL    Creatinine 1.16 0.66 - 1.25 mg/dL    GFR Estimate 61 >60 mL/min/1.7m2    GFR Estimate If Black 74 >60 mL/min/1.7m2    Calcium 9.1 8.5 - 10.1 mg/dL    Bilirubin Total 0.3 0.2 - 1.3 mg/dL    Albumin 3.6 3.4 - 5.0 g/dL    Protein Total 7.3 6.8 - 8.8 g/dL    Alkaline Phosphatase 125 40 - 150 U/L    ALT 33 0 - 70 U/L    AST 32 0 - 45 U/L

## 2018-07-19 NOTE — CONSULTS
Rectal bleeding   Lower GI bleed. Likely diverticular.  Patient on Plavix.  Hold Plavix  Start I/V PPI.  Prep tonight and colonoscopy and possible EGD in AM.    Roberth Dukes [facility]  [phone]

## 2018-07-19 NOTE — PHARMACY-ADMISSION MEDICATION HISTORY
"Admission medication history interview status for the 7/18/2018  admission is complete. See EPIC admission navigator for prior to admission medications     Medication history source reliability:Good    Actions taken by pharmacist (provider contacted, etc):None     Additional medication history information not noted on PTA med list :None    Medication reconciliation/reorder completed by provider prior to medication history? No    Time spent in this activity: 30\"    Prior to Admission medications    Medication Sig Last Dose Taking? Auth Provider   Acetaminophen (TYLENOL PO) Take 500 mg by mouth 2 times daily  7/18/2018 at am Yes Reported, Patient   ASPIRIN EC PO Take 81 mg by mouth daily 7/18/2018 at am Yes Unknown, Entered By History   ATORVASTATIN CALCIUM PO Take 40 mg by mouth every evening  7/17/2018 at pm Yes Reported, Patient   Clopidogrel Bisulfate (PLAVIX PO) Take 75 mg by mouth every morning  7/18/2018 at am Yes Reported, Patient   Co-Enzyme Q-10 100 MG CAPS Take 100 mg by mouth At Bedtime 7/17/2018 at hs Yes Unknown, Entered By History   diltiazem (TIAZAC) 240 MG 24 hr ER beaded capsule Take 240 mg by mouth every morning 7/18/2018 at am Yes Unknown, Entered By History   FINASTERIDE PO Take 5 mg by mouth At Bedtime 7/17/2018 at hs Yes Unknown, Entered By History   Insulin Regular Human (HUMULIN R U-500, CONCENTRATED, SC) Inject 60 Units Subcutaneous 3 times daily (before meals) Inject 60 Units of U-500 30 minutes before breakfast, 60 Units of U-500 30 minutes before afternoon meal and 60 Units of U-500 30 minutes before dinner.  (This is a HIGH RISK MEDICATION. U-500 INSULIN SYRINGES SHOULD BE USED TO MEASURE AND DELIVER INSULIN DOSE).  +++He states he uses the green syringes that go with the U-500 Insulin and draws up 60 units.+++ 7/18/2018 at lunch Yes Unknown, Entered By History   Isosorbide Mononitrate (IMDUR PO) Take 30 mg by mouth every morning  7/18/2018 at am Yes Reported, Patient   METFORMIN HCL PO " Take 500 mg by mouth 2 times daily (with meals)  7/18/2018 at am Yes Reported, Patient   METOPROLOL TARTRATE PO Take 12.5 mg by mouth 2 times daily  7/18/2018 at am Yes Reported, Patient   multivitamin, therapeutic (THERA-VIT) TABS tablet Take 1 tablet by mouth daily 7/18/2018 at am Yes Reported, Patient   SODIUM BICARBONATE PO Take 1,300 mg by mouth 2 times daily 10 grain tablets --he takes 2 tablets twice daily. 7/18/2018 at am Yes Reported, Patient   SPIRONOLACTONE PO Take 25 mg by mouth At Bedtime  7/17/2018 at hs Yes Reported, Patient   tamsulosin (FLOMAX) 0.4 MG capsule Take 0.4 mg by mouth At Bedtime  7/17/2018 at hs Yes Reported, Patient   VITAMIN D, CHOLECALCIFEROL, PO Take 2,000 Units by mouth 2 times daily  7/18/2018 at am Yes Reported, Patient

## 2018-07-19 NOTE — ED NOTES
"[facility]  ED Nurse Handoff Report    ED Chief complaint: Rectal Bleeding (started last night, no dizziness.  patient complains of some weakness.  On plavix.  patient states there is blood in bowl.  )      ED Diagnosis:   Final diagnoses:   Rectal hemorrhage       Code Status: Full Code    Allergies:   Allergies   Allergen Reactions     Losartan Hives       Activity level - Baseline/Home:  Independent    Activity Level - Current:   Independent     Needed?: No    Isolation: No  Infection: Not Applicable  Bariatric?: No    Vital Signs:   Vitals:    07/18/18 1913 07/18/18 2010 07/18/18 2030 07/18/18 2100   BP: 119/49 98/60 124/67 133/79   Resp: 18 16 15 18   Temp: 97.8  F (36.6  C)      TempSrc: Temporal      SpO2: 99% 92% 94% 94%   Weight: 106.1 kg (234 lb)      Height: 1.778 m (5' 10\")          Cardiac Rhythm: ,        Pain level: 0-10 Pain Scale: 0    Is this patient confused?: No   [place] - Suicide Severity Rating Scale Completed?  Yes  If yes, what color did the patient score?  White    Patient Report: Initial Complaint: Rectal Bleeding  Focused Assessment: Pt presents with rectal bleeding that started last night. States that he noted bright red blood in toilet after attempting to have a bowel movement. Denies pain. Pt takes ASA and plavix. Denies SOB or dizziness. Independent, A&O x4.  Tests Performed: Labs, Occult blood stool  Abnormal Results: Occult blood positive.  Treatments provided: IV    Family Comments: Wife at bedside.    OBS brochure/video discussed/provided to patient: N/A    ED Medications: Medications - No data to display    Drips infusing?:  No    For the majority of the shift this patient was Green.   Interventions performed were N/A.    Severe Sepsis OR Septic Shock Diagnosis Present: No      ED NURSE PHONE NUMBER: *[id number]         "

## 2018-07-19 NOTE — CONSULTS
[facility]  Gastroenterology Consultation         Alexys Coombs  [address] [place] [address]  72 year old male    Admission Date/Time: 7/18/2018  Primary Care Provider: No Ref-Primary, Physician  Referring / Attending Physician:  Dr. Hanson  We were asked to see the patient in consultation by Dr. Hanson for evaluation of Rectal bleeding.      CC: Rectal bleeding    HPI:  Alexys Coombs is a 72 year old male with a history of diverticulosis and hemorrhoids who presents with rectal bleeding. The patient states that last night he had a loose stool which may have contained blood, but he did not check. Today, he has had multiple bloody stools. His most recent bowel movement was all blood and did not contain feces. The blood in his stool has contained clots. He reports some weakness and abdominal pressure, but denies any shortness of breath, dizziness, or lightheadedness. He denies any abdominal pain.  He denies a history of stomach ulcers or prior GI bleed. No syncope. Denies other concerns. No rectal pain.     ROS: A comprehensive ten point review of systems was negative aside from those in mentioned in the HPI.      PAST MED HX:  I have reviewed this patient's medical history and updated it with pertinent information if needed.   Past Medical History:   Diagnosis Date     CAD (coronary artery disease)      Cerebral infarction (H)      COPD (chronic obstructive pulmonary disease) (H)      Diabetes (H)      Hypertension      PAD (peripheral artery disease) (H)        MEDICATIONS:   Prior to Admission Medications   Prescriptions Last Dose Informant Patient Reported? Taking?   ASPIRIN EC PO 7/18/2018 at am Self Yes Yes   Sig: Take 81 mg by mouth daily   ATORVASTATIN CALCIUM PO 7/17/2018 at pm Self Yes Yes   Sig: Take 40 mg by mouth every evening    Acetaminophen (TYLENOL PO) 7/18/2018 at am Self Yes Yes   Sig: Take 500 mg by mouth 2 times daily    Clopidogrel Bisulfate (PLAVIX PO)  7/18/2018 at am Self Yes Yes   Sig: Take 75 mg by mouth every morning    Co-Enzyme Q-10 100 MG CAPS 7/17/2018 at hs Self Yes Yes   Sig: Take 100 mg by mouth At Bedtime   FINASTERIDE PO 7/17/2018 at hs Self Yes Yes   Sig: Take 5 mg by mouth At Bedtime   Insulin Regular Human (HUMULIN R U-500, CONCENTRATED, SC) 7/18/2018 at lunch Self Yes Yes   Sig: Inject 60 Units Subcutaneous 3 times daily (before meals) Inject 60 Units of U-500 30 minutes before breakfast, 60 Units of U-500 30 minutes before afternoon meal and 60 Units of U-500 30 minutes before dinner.  (This is a HIGH RISK MEDICATION. U-500 INSULIN SYRINGES SHOULD BE USED TO MEASURE AND DELIVER INSULIN DOSE).  +++He states he uses the green syringes that go with the U-500 Insulin and draws up 60 units.+++   Isosorbide Mononitrate (IMDUR PO) 7/18/2018 at am Self Yes Yes   Sig: Take 30 mg by mouth every morning    METFORMIN HCL PO 7/18/2018 at am Self Yes Yes   Sig: Take 500 mg by mouth 2 times daily (with meals)    METOPROLOL TARTRATE PO 7/18/2018 at am Self Yes Yes   Sig: Take 12.5 mg by mouth 2 times daily    SODIUM BICARBONATE PO 7/18/2018 at am Self Yes Yes   Sig: Take 1,300 mg by mouth 2 times daily 10 grain tablets --he takes 2 tablets twice daily.   SPIRONOLACTONE PO 7/17/2018 at hs Self Yes Yes   Sig: Take 25 mg by mouth At Bedtime    VITAMIN D, CHOLECALCIFEROL, PO 7/18/2018 at am Self Yes Yes   Sig: Take 2,000 Units by mouth 2 times daily    diltiazem (TIAZAC) 240 MG 24 hr ER beaded capsule 7/18/2018 at am Self Yes Yes   Sig: Take 240 mg by mouth every morning   multivitamin, therapeutic (THERA-VIT) TABS tablet 7/18/2018 at am Self Yes Yes   Sig: Take 1 tablet by mouth daily   tamsulosin (FLOMAX) 0.4 MG capsule 7/17/2018 at hs Self Yes Yes   Sig: Take 0.4 mg by mouth At Bedtime       Facility-Administered Medications: None       ALLERGIES:   Allergies   Allergen Reactions     Losartan Hives       SOCIAL HISTORY:  Social History   Substance Use Topics      Smoking status: Former Smoker     Quit date: 1/19/2000     Smokeless tobacco: Never Used     Alcohol use Yes      Comment: 1 week       FAMILY HISTORY:  History reviewed. No pertinent family history.    PHYSICAL EXAM:   General awake alert oriented comfortable  Vital Signs with Ranges  Temp: 97.7  F (36.5  C) Temp src: Oral BP: 120/52 Pulse: 83 Heart Rate: 79 Resp: 14 SpO2: 94 % O2 Device: None (Room air) Oxygen Delivery: 3 LPM       Constitutional: healthy, alert and no distress   Cardiovascular: negative, PMI normal. No lifts, heaves, or thrills. RRR. No murmurs, clicks gallops or rub  Respiratory: negative, Percussion normal. Good diaphragmatic excursion. Lungs clear  Head: Normocephalic. No masses, lesions, tenderness or abnormalities  Neck: Neck supple. No adenopathy. Thyroid symmetric, normal size,, Carotids without bruits.  Abdomen: Abdomen soft, non-tender. BS normal. No masses, organomegaly          ADDITIONAL COMMENTS:   I reviewed the patient's new clinical lab test results.   Recent Labs   Lab Test  07/19/18   0751  07/18/18   2255  07/18/18 2008   WBC  13.5*   --   12.7*   HGB  11.8*  13.1*  13.4   MCV  90   --   91   PLT  205   --   216   INR  1.00   --   1.00     Recent Labs   Lab Test  07/19/18   0751  07/18/18 2008   POTASSIUM  4.2  5.1   CHLORIDE  109  111*   CO2  22  20   BUN  43*  54*   ANIONGAP  11  9     Recent Labs   Lab Test  07/19/18   0751  07/18/18 2008   ALBUMIN  3.2*  3.6   BILITOTAL  0.6  0.3   ALT  29  33   AST  29  32   LIPASE  84   --        I reviewed the patient's new imaging results.        CONSULTATION ASSESSMENT AND PLAN:    Active Problems:    GI bleed  Very pleasant 72-year-old gentleman with history of rectal bleeding with extensive diverticulosis, history of stroke coronary artery disease with drop in hemoglobin from 13.4 at the time of admission to 11.8.  Patient findings are quite suspicious for diverticular bleed less likely neoplastic process or upper GI bleed.  At  this point I would recommend the patient to be continued on supportive care type and cross IV Protonix and will schedule him for colonoscopy and possible upper endoscopy.  Differential diagnosis and plan was discussed with patient patient was reassured.  Thank you very much for letting me participate in his care.            Roberth Dukes MD, FACP  [facility].  Office: [phone]  Cell : [phone]

## 2018-07-19 NOTE — PLAN OF CARE
Problem: Patient Care Overview  Goal: Plan of Care/Patient Progress Review  Outcome: No Change  Admission    Patient arrives to room 605-1 via cart from ED.  Care plan note: A+Ox4, up with SBA. Bowel prep started. IVF @ 100. VSS, on RA. Denies pain. Nursing will continue to monitor.     Inpatient nursing criteria listed below were met:    PCD's Documented: Yes  Skin issues/needs documented :NA  Isolation education started/completed NA  Patient allergies verified with patient: Yes  Verified completion of [place] Risk Assessment Tool:  Yes  Verified completion of Guardianship screening tool: Yes  Fall Prevention: Care plan updated, Education given and documented Yes  Care Plan initiated: Yes  Home medications documented in belongings flowsheet: NA  Patient belongings documented in belongings flowsheet: Yes  Reminder note (belongings/ medications) placed in discharge instructions:NA  Admission profile/ required documentation complete:  YES

## 2018-07-19 NOTE — PROGRESS NOTES
Discharge    Patient discharged to home via family transportation with wife  Care plan note Pt is alert.Colonoscopy and endoscopy done .No GI bleeding ,vomiting ,pain or nausea.Discharge paperwork given.Pt went home with family.Vital signs stable.    Listed belongings gathered and returned to patient. Yes  Care Plan and Patient education resolved: yes  Prescriptions if needed, hard copies sent with patient  N/A  Home and hospital acquired medications returned to patient: N/A  Medication Bin checked and emptied on discharge yes  Follow up appointment made for patient: NO Pt to make his own appointment.

## 2018-07-19 NOTE — ED PROVIDER NOTES
"  History     Chief Complaint:  Rectal Bleeding    HPI   Alexys Coombs is a 72 year old male with a history of diverticulosis and hemorrhoids who presents with rectal bleeding. The patient states that last night he had a loose stool which may have contained blood, but he did not check. Today, he has had multiple bloody stools. His most recent bowel movement was all blood and did not contain feces. The blood in his stool has contained clots. He reports some weakness and abdominal pressure, but denies any shortness of breath, dizziness, or lightheadedness. He denies any abdominal pain.  He denies a history of stomach ulcers or prior GI bleed. No syncope. Denies other concerns. No rectal pain.    Allergies:  Losartan     Medications:    Tylenol  Atorvastatin  Plavix  Diltiazem  Humulin  Imdur  Metformin  Metoprolol  Flomax     Past Medical History:    CAD  Cerebral infarction  Diabetes  Hypertension  PAD  Diverticulosis  hemorrhoids    Past Surgical History:    Cardiac surgery    Family History:    No past pertinent family history.    Social History:  Patient presents with wife.     Review of Systems   Respiratory: Negative for shortness of breath.    Gastrointestinal: Positive for abdominal pain, anal bleeding and blood in stool. Negative for constipation.   Neurological: Positive for weakness. Negative for dizziness and light-headedness.   All other systems reviewed and are negative.      Physical Exam   First Vitals:  BP: 119/49  Heart Rate: 76  Temp: 97.8  F (36.6  C)  Resp: 18  Height: 177.8 cm (5' 10\")  Weight: 106.1 kg (234 lb)  SpO2: 99 %      Physical Exam  General: Well appearing, nontoxic. Resting comfortably  Head:  Scalp, face, and head appear normal  Eyes:  Pupils are equal, round, and reactive to light    Conjunctivae non-injected and sclerae white  ENT:    The external nose is normal    Pinnae are normal    The oropharynx is normal, mucous membranes moist    Uvula is in the midline  Neck:  Normal " range of motion    There is no rigidity noted    Trachea is in the midline  CV:  Regular rate and rhythm     Normal S1/S2, no S3/S4    No murmur or rub. Radial pulses 2+ bilaterally   Resp:  Lungs are clear and equal bilaterally    There is no tachypnea    No increased work of breathing    No rales, wheezing, or rhonchi  GI:  Abdomen is soft, obese, no rigidity or guarding    No distension, or mass    Mild tenderness left upper lateral abdomen. No rebound tenderness   Rectal Exam:  No external anal lesions. Rectal tone normal. + Maroon red blood in rectal vault. Prostate normal. No internal masses or lesions palpated.   MS:  Normal muscular tone    Symmetric motor strength    No lower extremity edema  Skin:  No rash or acute skin lesions noted  Neuro: Awake and alert    Speech is normal and fluent    Moves all extremities spontaneously  Psych:  Normal affect.  Appropriate interactions.     Emergency Department Course     Laboratory:  CBC: WBC: 12.7, HGB: 13.4, PLT: 216  ABO/Rh: Pending  INR: 1.00  Partial thromboplastin: 26  CMP: Glucose 146 (H), Chloride: 111 (H), Bun: 54 (H), o/w WNL (Creatinine: 1.16)  Occult blood stool: Positive    Emergency Department Course:  Nursing notes and vitals reviewed.  1953: I performed a regular and rectal exam of the patient as documented above. IV inserted and blood drawn.  Labs ordered.    2109 -Findings and plan explained to the Patient who consents to admission.     2115: Discussed the patient with hospitalist, Dr. Hanson, who will admit the patient for further monitoring, evaluation, and treatment.     2135: I discussed the case with Dr. Dukes of Gastroenterology regarding the patient.      Impression & Plan      Medical Decision Making:  Alexys Coombs is a 72 year old male who presents with blood in stool.  I considered a broad differential diagnosis for this patient including upper GIB (ulcer, gastritis, avm, tumor, etc) vs lower GIB (tumor, diverticulosis, avm,  meckels, etc), colitis, aortoenteric fistula, hemorrhoids, fissures,  Ischemic colitis, bacterial stool infection (salmonella, shigella, e. Coli, campylobacter).  The workup in the ED is consistent with gastrointestinal bleeding, although it is unclear if it is lower or upper.  I favor lower at this time due to lack of vomiting, epigastric pain, lack of preceding black stools, no history of ulcers or NSAID use. In addition patient does have a history of diverticulosis which may favor a diverticular bleed. Rectal exam with gross blood. Patient has remained hemodynamically stable. His abdominal exam is benign without significant tenderness to palpation. Initial hemoglobin is normal. Given amount of blood and exam, I would admit at this point for serial hemoglobins and evaluation by GI. I spoke with Dr. Dukes who recommended colon prep tonight for possible colonoscopy tomorrow.  Patient was typed and screened.  Discussed with hospitalist. The patient was agreeable with plan for admission and he was admitted in stable condition.     Diagnosis:    ICD-10-CM    1. Rectal hemorrhage K62.5      Tip WEST, am serving as a scribe on 7/18/2018 at 7:53 PM to personally document services performed by Tip Morgan MD based on my observations and the provider's statements to me.       Tip Beckwith  7/18/2018    EMERGENCY DEPARTMENT       Tip Morgan MD  07/19/18 0052

## 2018-07-19 NOTE — PROGRESS NOTES
RECEIVING UNIT ED HANDOFF REVIEW    ED Nurse Handoff Report was reviewed by: Chantal Kang on July 18, 2018 at 10:11 PM

## 2018-07-26 ENCOUNTER — DOCUMENTATION ONLY (OUTPATIENT)
Dept: OTHER | Facility: CLINIC | Age: 73
End: 2018-07-26

## 2018-08-09 ENCOUNTER — APPOINTMENT (RX ONLY)
Dept: URBAN - METROPOLITAN AREA CLINIC 6 | Facility: CLINIC | Age: 73
Setting detail: DERMATOLOGY
End: 2018-08-09

## 2018-08-09 DIAGNOSIS — L57.0 ACTINIC KERATOSIS: ICD-10-CM

## 2018-08-09 DIAGNOSIS — D485 NEOPLASM OF UNCERTAIN BEHAVIOR OF SKIN: ICD-10-CM

## 2018-08-09 DIAGNOSIS — L85.3 XEROSIS CUTIS: ICD-10-CM

## 2018-08-09 DIAGNOSIS — L82.0 INFLAMED SEBORRHEIC KERATOSIS: ICD-10-CM

## 2018-08-09 DIAGNOSIS — L82.1 OTHER SEBORRHEIC KERATOSIS: ICD-10-CM

## 2018-08-09 PROBLEM — D48.5 NEOPLASM OF UNCERTAIN BEHAVIOR OF SKIN: Status: ACTIVE | Noted: 2018-08-09

## 2018-08-09 PROBLEM — E13.9 OTHER SPECIFIED DIABETES MELLITUS WITHOUT COMPLICATIONS: Status: ACTIVE | Noted: 2018-08-09

## 2018-08-09 PROBLEM — J44.9 CHRONIC OBSTRUCTIVE PULMONARY DISEASE, UNSPECIFIED: Status: ACTIVE | Noted: 2018-08-09

## 2018-08-09 PROCEDURE — 11100: CPT | Mod: 59

## 2018-08-09 PROCEDURE — ? BIOPSY BY SHAVE METHOD

## 2018-08-09 PROCEDURE — ? LIQUID NITROGEN

## 2018-08-09 PROCEDURE — 17000 DESTRUCT PREMALG LESION: CPT | Mod: 59

## 2018-08-09 PROCEDURE — ? COUNSELING

## 2018-08-09 PROCEDURE — 99203 OFFICE O/P NEW LOW 30 MIN: CPT | Mod: 25

## 2018-08-09 PROCEDURE — ? TREATMENT REGIMEN

## 2018-08-09 PROCEDURE — 11101: CPT

## 2018-08-09 PROCEDURE — 17003 DESTRUCT PREMALG LES 2-14: CPT

## 2018-08-09 PROCEDURE — 17110 DESTRUCTION B9 LES UP TO 14: CPT

## 2018-08-09 ASSESSMENT — LOCATION SIMPLE DESCRIPTION DERM
LOCATION SIMPLE: RIGHT ANTERIOR NECK
LOCATION SIMPLE: CHEST
LOCATION SIMPLE: LEFT SHOULDER
LOCATION SIMPLE: GLABELLA
LOCATION SIMPLE: LEFT UPPER BACK
LOCATION SIMPLE: UPPER BACK
LOCATION SIMPLE: RIGHT FOREARM
LOCATION SIMPLE: ABDOMEN
LOCATION SIMPLE: LEFT ANTERIOR NECK
LOCATION SIMPLE: LEFT FOREARM
LOCATION SIMPLE: RIGHT SCALP
LOCATION SIMPLE: RIGHT UPPER BACK

## 2018-08-09 ASSESSMENT — LOCATION DETAILED DESCRIPTION DERM
LOCATION DETAILED: RIGHT PROXIMAL DORSAL FOREARM
LOCATION DETAILED: RIGHT MEDIAL INFERIOR CHEST
LOCATION DETAILED: LEFT SUPERIOR UPPER BACK
LOCATION DETAILED: INFERIOR THORACIC SPINE
LOCATION DETAILED: LEFT MID-UPPER BACK
LOCATION DETAILED: GLABELLA
LOCATION DETAILED: LEFT CLAVICULAR NECK
LOCATION DETAILED: LEFT POSTERIOR SHOULDER
LOCATION DETAILED: RIGHT INFERIOR UPPER BACK
LOCATION DETAILED: RIGHT SUPERIOR UPPER BACK
LOCATION DETAILED: LEFT LATERAL ABDOMEN
LOCATION DETAILED: LEFT PROXIMAL DORSAL FOREARM
LOCATION DETAILED: RIGHT CENTRAL FRONTAL SCALP
LOCATION DETAILED: RIGHT CLAVICULAR NECK
LOCATION DETAILED: LEFT DISTAL DORSAL FOREARM

## 2018-08-09 ASSESSMENT — LOCATION ZONE DERM
LOCATION ZONE: SCALP
LOCATION ZONE: ARM
LOCATION ZONE: NECK
LOCATION ZONE: TRUNK
LOCATION ZONE: FACE

## 2018-08-09 NOTE — PROCEDURE: MIPS QUALITY
Quality 110: Preventive Care And Screening: Influenza Immunization: Influenza Immunization Administered during Influenza season
Quality 431: Preventive Care And Screening: Unhealthy Alcohol Use - Screening: Patient screened for unhealthy alcohol use using a single question and scores less than 2 times per year
Name And Contact Information For Health Care Proxy: Sabasjacklyn Lucrecia [phone]
Quality 111:Pneumonia Vaccination Status For Older Adults: Pneumococcal Vaccination Previously Received
Quality 226: Preventive Care And Screening: Tobacco Use: Screening And Cessation Intervention: Patient screened for tobacco and never smoked
Detail Level: Detailed

## 2018-08-09 NOTE — PROCEDURE: BIOPSY BY SHAVE METHOD
Bill For Surgical Tray: no
Path Notes (To The Dermatopathologist): Size 0.9 R/O DN
Biopsy Type: H and E
Billing Type: [employer]
Additional Anesthesia Volume In Cc (Will Not Render If 0): 0
Silver Nitrate Text: The wound bed was treated with silver nitrate after the biopsy was performed.
Cryotherapy Text: The wound bed was treated with cryotherapy after the biopsy was performed.
Lab: [id number]  [facility]
Dressing: bandage
Lab Facility:  [facility]
Anesthesia Type: 1% lidocaine with epinephrine
Hemostasis: Drysol
Electrodesiccation Text: The wound bed was treated with electrodesiccation after the biopsy was performed.
Type Of Destruction Used: Curettage
Notification Instructions: Patient will be notified of biopsy results. However, patient instructed to call the office if not contacted within 2 weeks.
Curettage Text: The wound bed was treated with curettage after the biopsy was performed.
Anesthesia Volume In Cc (Will Not Render If 0): 0.5
Electrodesiccation And Curettage Text: The wound bed was treated with electrodesiccation and curettage after the biopsy was performed.
Was A Bandage Applied: Yes
Wound Care: Petrolatum
Post-Care Instructions: I reviewed with the patient in detail post-care instructions. Patient is to keep the biopsy site dry overnight, and then apply bacitracin twice daily until healed. Patient may apply hydrogen peroxide soaks to remove any crusting.
Depth Of Biopsy: dermis
Biopsy Method: Dermablade
Consent: Written consent was obtained and risks were reviewed including but not limited to scarring, infection, bleeding, scabbing, incomplete removal, nerve damage and allergy to anesthesia.
Detail Level: Detailed
Path Notes (To The Dermatopathologist): Size 0.7  R/O BCC
Billing Type: Third-Party Bill
Lab: [id number]
Lab Facility: [id number]
Path Notes (To The Dermatopathologist): Size 0.5 R/O DN

## (undated) RX ORDER — FENTANYL CITRATE 50 UG/ML
INJECTION, SOLUTION INTRAMUSCULAR; INTRAVENOUS
Status: DISPENSED
Start: 2018-07-19